# Patient Record
Sex: FEMALE | Race: WHITE
[De-identification: names, ages, dates, MRNs, and addresses within clinical notes are randomized per-mention and may not be internally consistent; named-entity substitution may affect disease eponyms.]

---

## 2021-12-16 ENCOUNTER — HOSPITAL ENCOUNTER (OUTPATIENT)
Dept: HOSPITAL 33 - SDC-PAIN | Age: 86
Discharge: HOME | End: 2021-12-16
Attending: PSYCHIATRY & NEUROLOGY
Payer: MEDICARE

## 2021-12-16 DIAGNOSIS — E11.9: ICD-10-CM

## 2021-12-16 DIAGNOSIS — M46.1: Primary | ICD-10-CM

## 2021-12-16 DIAGNOSIS — Z79.899: ICD-10-CM

## 2021-12-16 DIAGNOSIS — I10: ICD-10-CM

## 2021-12-16 PROCEDURE — 82947 ASSAY GLUCOSE BLOOD QUANT: CPT

## 2021-12-16 PROCEDURE — 99100 ANES PT EXTEME AGE<1 YR&>70: CPT

## 2021-12-16 PROCEDURE — 72020 X-RAY EXAM OF SPINE 1 VIEW: CPT

## 2021-12-16 PROCEDURE — 77002 NEEDLE LOCALIZATION BY XRAY: CPT

## 2021-12-16 PROCEDURE — 27096 INJECT SACROILIAC JOINT: CPT

## 2021-12-16 PROCEDURE — G0260 INJ FOR SACROILIAC JT ANESTH: HCPCS

## 2021-12-16 NOTE — XRAY
Indication: Right SI joint injection.



Intraoperative fluoroscopy provided for 25 seconds.  2 digital spot images

submitted for interpretation demonstrates posterior needle tip projecting over

the inferior right SI joint.  Correlate with intraoperative findings/report.

## 2022-01-12 ENCOUNTER — HOSPITAL ENCOUNTER (OUTPATIENT)
Dept: HOSPITAL 33 - SDC-PAIN | Age: 87
Discharge: HOME | End: 2022-01-12
Attending: PSYCHIATRY & NEUROLOGY
Payer: COMMERCIAL

## 2022-01-12 DIAGNOSIS — E11.9: ICD-10-CM

## 2022-01-12 DIAGNOSIS — I10: ICD-10-CM

## 2022-01-12 DIAGNOSIS — Z79.899: ICD-10-CM

## 2022-01-12 DIAGNOSIS — M54.16: Primary | ICD-10-CM

## 2022-01-12 PROCEDURE — 62323 NJX INTERLAMINAR LMBR/SAC: CPT

## 2022-01-12 PROCEDURE — 77003 FLUOROGUIDE FOR SPINE INJECT: CPT

## 2022-01-12 PROCEDURE — 72100 X-RAY EXAM L-S SPINE 2/3 VWS: CPT

## 2022-01-12 PROCEDURE — 82947 ASSAY GLUCOSE BLOOD QUANT: CPT

## 2022-01-12 NOTE — XRAY
Indication: Lumbar VARSHA.



Intraoperative fluoroscopy provided for 21 seconds.  2 digital spot images

submitted for interpretation demonstrates posterior midline needle tip

projecting posterior to L1-L2 interspace.  Small amount of contrast injected

for needle tip placement.  Correlate with intraoperative findings/report.

## 2022-02-16 ENCOUNTER — HOSPITAL ENCOUNTER (OUTPATIENT)
Dept: HOSPITAL 33 - SDC-PAIN | Age: 87
Discharge: HOME | End: 2022-02-16
Attending: PSYCHIATRY & NEUROLOGY
Payer: MEDICARE

## 2022-02-16 DIAGNOSIS — M70.61: ICD-10-CM

## 2022-02-16 DIAGNOSIS — I10: ICD-10-CM

## 2022-02-16 DIAGNOSIS — E11.9: ICD-10-CM

## 2022-02-16 DIAGNOSIS — Z79.899: ICD-10-CM

## 2022-02-16 DIAGNOSIS — M46.1: Primary | ICD-10-CM

## 2022-02-16 PROCEDURE — G0260 INJ FOR SACROILIAC JT ANESTH: HCPCS

## 2022-02-16 PROCEDURE — 77002 NEEDLE LOCALIZATION BY XRAY: CPT

## 2022-02-16 PROCEDURE — 20610 DRAIN/INJ JOINT/BURSA W/O US: CPT

## 2022-02-16 PROCEDURE — 99100 ANES PT EXTEME AGE<1 YR&>70: CPT

## 2022-02-16 PROCEDURE — 27096 INJECT SACROILIAC JOINT: CPT

## 2022-02-16 PROCEDURE — 73501 X-RAY EXAM HIP UNI 1 VIEW: CPT

## 2022-02-16 PROCEDURE — 72020 X-RAY EXAM OF SPINE 1 VIEW: CPT

## 2022-02-16 PROCEDURE — 82947 ASSAY GLUCOSE BLOOD QUANT: CPT

## 2022-02-16 NOTE — XRAY
Indication: Right SI joint injection.



Intraoperative fluoroscopy provided for 11 seconds.  2 digital spot images

submitted for interpretation demonstrates posterior needle tip projecting over

the inferior right SI joint.  Correlate with intraoperative findings/report.

Incidental incompletely visualized lower lumbar posterior fusion hardware.

## 2022-02-16 NOTE — XRAY
Indication: Right greater trochanter bursa injection.



Intraoperative fluoroscopy provided for 10 seconds.  Single digital spot image

submitted for interpretation demonstrates needle tip lateral to the right

greater trochanter.  Small amount of contrast injected for needle tip

placement.  Correlate with intraoperative findings/report.

## 2022-02-16 NOTE — XRAY
10 seconds of fluoroscopy was used in surgery for a greater trochanteric bursa

injection of the right hip.

## 2022-03-24 ENCOUNTER — HOSPITAL ENCOUNTER (OUTPATIENT)
Dept: HOSPITAL 33 - SDC-PAIN | Age: 87
Discharge: HOME | End: 2022-03-24
Attending: PSYCHIATRY & NEUROLOGY
Payer: MEDICARE

## 2022-03-24 DIAGNOSIS — E11.9: ICD-10-CM

## 2022-03-24 DIAGNOSIS — Z79.899: ICD-10-CM

## 2022-03-24 DIAGNOSIS — I10: ICD-10-CM

## 2022-03-24 DIAGNOSIS — M54.16: Primary | ICD-10-CM

## 2022-03-24 PROCEDURE — 72100 X-RAY EXAM L-S SPINE 2/3 VWS: CPT

## 2022-03-24 PROCEDURE — 77003 FLUOROGUIDE FOR SPINE INJECT: CPT

## 2022-03-24 PROCEDURE — 82947 ASSAY GLUCOSE BLOOD QUANT: CPT

## 2022-03-24 PROCEDURE — 62323 NJX INTERLAMINAR LMBR/SAC: CPT

## 2022-03-24 NOTE — XRAY
Indication: Lumbar VARSHA.



Intraoperative fluoroscopy provided for 17 seconds.  2 digital spot images

submitted for interpretation demonstrate midline posterior needle tip

projecting posterior to L2-L3 interspace.  Small amount of contrast injected

for needle tip placement.  Correlate with intraoperative findings/report.

Incidental incompletely visualized L3-L5 posterior fusion hardware.